# Patient Record
(demographics unavailable — no encounter records)

---

## 2024-10-21 NOTE — HISTORY OF PRESENT ILLNESS
[___ Days Post Op] : post op day #[unfilled] [Chills] : no chills [Fever] : no fever [Nausea] : no nausea [Vomiting] : no vomiting [Clean/Dry/Intact] : clean, dry and intact [Erythema] : not erythematous [Discharge] : absent of discharge [Swelling] : not swollen [Dehiscence] : not dehisced [Xray (Date:___)] : [unfilled] Xray -  [Doing Well] : is doing well [No Sign of Infection] : is showing no signs of infection [Adequate Pain Control] : has adequate pain control [de-identified] : SPO: Left knee open removal of hardware DOS: 10/09/2024 [de-identified] : JESSICA is a 29 year male here today for 1st post operative visit. He denies fever or chills, redness around or near the incision site(s), numbness/tingling. He denies nausea/ vomiting and admits to their appetite since their surgery being back to normal. Normal bowel habits at this time. Patient has started physical therapy. Patient presents today FWB. Patient since their surgery has utilized tylenol as their primary pain control with relief in their symptoms. They no longer require narcotics for pain control.  [de-identified] : Left Knee: There is moderate effusion without warmth and mild ecchymosis throughout the leg. Knee motion is 0 - 90 degrees of passive ROM, straight leg raise without extension lag and pain free. Good quad strength. Full sensation intact and dorsalis pedis pulses 2+.  Special Tests: NEG Lachman, NEG anterior drawer, NEG posterior drawer with collateral testing and varus/valgus normal.NEG Homans, no calf tenderness, soft and compressible. The surgical incision site(s) was clean, dry and intact. Additional findings included an unremarkable neurological exam and peripheral vascular exam normal. [de-identified] : Surgical imaging was reviewed in great detail with the patient. The following radiographs were ordered and read by me during this patient's visit. I reviewed each radiograph in detail with the patient and discussed the findings as highlighted below. 2 views of the left knee were obtained today that show no fracture, dislocation. There is no degenerative change seen. There is no malalignment. No obvious osseous abnormality. Otherwise unremarkable.  [de-identified] : I had a discussion with the patient regarding the operative and postoperative course. The patient is doing well. He should continue with physical therapy. Patient will follow up in 4 wks for repeat clinical assessment.

## 2024-10-21 NOTE — ADDENDUM
[FreeTextEntry1] : Documented by Audra Canela acting as a scribe for Dr. Boo and Leonidas Mederos PA-C on 10/21/2024. All medical record entries made by the Scribe were at my, Dr. Boo, and Leonidas Mederos's, direction and personally dictated by me on 10/21/2024. I have reviewed the chart and agree that the record accurately reflects my personal performance of the history, physical exam, procedure and imaging.

## 2024-11-20 NOTE — ADDENDUM
[FreeTextEntry1] : Documented by Audra Canela acting as a scribe for Dr. Boo and Leonidas Mederos PA-C on 11/20/2024. All medical record entries made by the Scribe were at my, Dr. Boo, and Leonidas Mederos's, direction and personally dictated by me on 11/20/2024. I have reviewed the chart and agree that the record accurately reflects my personal performance of the history, physical exam, procedure and imaging.

## 2024-11-20 NOTE — HISTORY OF PRESENT ILLNESS
[___ Days Post Op] : post op day #[unfilled] [Clean/Dry/Intact] : clean, dry and intact [Healed] : healed [Doing Well] : is doing well [No Sign of Infection] : is showing no signs of infection [Adequate Pain Control] : has adequate pain control [Chills] : no chills [Fever] : no fever [Nausea] : no nausea [Vomiting] : no vomiting [Erythema] : not erythematous [Discharge] : absent of discharge [Swelling] : not swollen [Dehiscence] : not dehisced [de-identified] : SPO: Left knee open removal of hardware DOS: 10/09/2024 [de-identified] : JESSICA is a 29 year male here today for 2nd post operative visit. He denies fever or chills, redness around or near the incision site(s), numbness/tingling. He denies nausea/ vomiting and admits to their appetite since their surgery being back to normal. Normal bowel habits at this time. Patient has no longer been following with physical therapy. States that he has been working on gaining strength independently. Patient presents today FWB. Patient since their surgery has utilized tylenol as their primary pain control with relief in their symptoms. They no longer require narcotics for pain control.  [de-identified] : Left Knee: There is moderate effusion without warmth and mild ecchymosis throughout the leg. Knee motion is 0 - 120 degrees of passive ROM, straight leg raise without extension lag and pain free. Good quad strength. Full sensation intact and dorsalis pedis pulses 2+.  Special Tests: NEG Lachman, NEG anterior drawer, NEG posterior drawer with collateral testing and varus/valgus normal. NEG Homans, no calf tenderness, soft and compressible. The surgical incision site(s) was clean, dry and intact. Additional findings included an unremarkable neurological exam and peripheral vascular exam normal. [de-identified] : No new imaging.  [de-identified] : I had a discussion with the patient regarding the operative and postoperative course. The patient is doing well. He should continue strengthening his L knee with a home exercise program. He should continue to work on gaining the remaining degrees of flexion. Patient will follow up as needed for repeat clinical assessment.